# Patient Record
Sex: FEMALE | Race: WHITE | NOT HISPANIC OR LATINO
[De-identification: names, ages, dates, MRNs, and addresses within clinical notes are randomized per-mention and may not be internally consistent; named-entity substitution may affect disease eponyms.]

---

## 2020-03-30 PROBLEM — Z00.00 ENCOUNTER FOR PREVENTIVE HEALTH EXAMINATION: Status: ACTIVE | Noted: 2020-03-30

## 2020-04-14 ENCOUNTER — APPOINTMENT (OUTPATIENT)
Dept: OBGYN | Facility: CLINIC | Age: 30
End: 2020-04-14

## 2020-08-28 ENCOUNTER — APPOINTMENT (OUTPATIENT)
Dept: OBGYN | Facility: CLINIC | Age: 30
End: 2020-08-28

## 2020-09-03 ENCOUNTER — APPOINTMENT (OUTPATIENT)
Dept: OBGYN | Facility: CLINIC | Age: 30
End: 2020-09-03

## 2020-10-20 ENCOUNTER — OUTPATIENT (OUTPATIENT)
Dept: OUTPATIENT SERVICES | Facility: HOSPITAL | Age: 30
LOS: 1 days | Discharge: HOME | End: 2020-10-20

## 2020-10-20 DIAGNOSIS — K02.63 DENTAL CARIES ON SMOOTH SURFACE PENETRATING INTO PULP: ICD-10-CM

## 2020-10-28 ENCOUNTER — OUTPATIENT (OUTPATIENT)
Dept: OUTPATIENT SERVICES | Facility: HOSPITAL | Age: 30
LOS: 1 days | Discharge: HOME | End: 2020-10-28

## 2020-10-28 DIAGNOSIS — K06.1 GINGIVAL ENLARGEMENT: ICD-10-CM

## 2020-11-27 ENCOUNTER — APPOINTMENT (OUTPATIENT)
Dept: OBGYN | Facility: CLINIC | Age: 30
End: 2020-11-27
Payer: MEDICAID

## 2020-11-27 PROCEDURE — 99072 ADDL SUPL MATRL&STAF TM PHE: CPT

## 2020-11-27 PROCEDURE — 99213 OFFICE O/P EST LOW 20 MIN: CPT

## 2020-11-27 PROCEDURE — 81002 URINALYSIS NONAUTO W/O SCOPE: CPT

## 2021-05-22 ENCOUNTER — INPATIENT (INPATIENT)
Facility: HOSPITAL | Age: 31
LOS: 1 days | Discharge: HOME | End: 2021-05-24
Attending: INTERNAL MEDICINE | Admitting: INTERNAL MEDICINE
Payer: MEDICAID

## 2021-05-22 VITALS
HEIGHT: 66 IN | SYSTOLIC BLOOD PRESSURE: 126 MMHG | RESPIRATION RATE: 18 BRPM | DIASTOLIC BLOOD PRESSURE: 76 MMHG | TEMPERATURE: 99 F | WEIGHT: 134.04 LBS | HEART RATE: 116 BPM | OXYGEN SATURATION: 99 %

## 2021-05-22 LAB
ALBUMIN SERPL ELPH-MCNC: 4.3 G/DL — SIGNIFICANT CHANGE UP (ref 3.5–5.2)
ALP SERPL-CCNC: 84 U/L — SIGNIFICANT CHANGE UP (ref 30–115)
ALT FLD-CCNC: 15 U/L — SIGNIFICANT CHANGE UP (ref 0–41)
ANION GAP SERPL CALC-SCNC: 10 MMOL/L — SIGNIFICANT CHANGE UP (ref 7–14)
APPEARANCE UR: CLEAR — SIGNIFICANT CHANGE UP
AST SERPL-CCNC: 17 U/L — SIGNIFICANT CHANGE UP (ref 0–41)
BASOPHILS # BLD AUTO: 0.03 K/UL — SIGNIFICANT CHANGE UP (ref 0–0.2)
BASOPHILS NFR BLD AUTO: 0.4 % — SIGNIFICANT CHANGE UP (ref 0–1)
BILIRUB SERPL-MCNC: 0.6 MG/DL — SIGNIFICANT CHANGE UP (ref 0.2–1.2)
BILIRUB UR-MCNC: NEGATIVE — SIGNIFICANT CHANGE UP
BUN SERPL-MCNC: 17 MG/DL — SIGNIFICANT CHANGE UP (ref 10–20)
CALCIUM SERPL-MCNC: 9.8 MG/DL — SIGNIFICANT CHANGE UP (ref 8.5–10.1)
CHLORIDE SERPL-SCNC: 103 MMOL/L — SIGNIFICANT CHANGE UP (ref 98–110)
CO2 SERPL-SCNC: 25 MMOL/L — SIGNIFICANT CHANGE UP (ref 17–32)
COLOR SPEC: YELLOW — SIGNIFICANT CHANGE UP
CREAT SERPL-MCNC: 0.8 MG/DL — SIGNIFICANT CHANGE UP (ref 0.7–1.5)
DIFF PNL FLD: NEGATIVE — SIGNIFICANT CHANGE UP
EOSINOPHIL # BLD AUTO: 0.06 K/UL — SIGNIFICANT CHANGE UP (ref 0–0.7)
EOSINOPHIL NFR BLD AUTO: 0.7 % — SIGNIFICANT CHANGE UP (ref 0–8)
GLUCOSE SERPL-MCNC: 102 MG/DL — HIGH (ref 70–99)
GLUCOSE UR QL: NEGATIVE MG/DL — SIGNIFICANT CHANGE UP
HCG SERPL-ACNC: <0.6 MIU/ML — SIGNIFICANT CHANGE UP
HCT VFR BLD CALC: 44 % — SIGNIFICANT CHANGE UP (ref 37–47)
HGB BLD-MCNC: 14 G/DL — SIGNIFICANT CHANGE UP (ref 12–16)
IMM GRANULOCYTES NFR BLD AUTO: 0.4 % — HIGH (ref 0.1–0.3)
KETONES UR-MCNC: NEGATIVE — SIGNIFICANT CHANGE UP
LACTATE SERPL-SCNC: 0.8 MMOL/L — SIGNIFICANT CHANGE UP (ref 0.7–2)
LEUKOCYTE ESTERASE UR-ACNC: NEGATIVE — SIGNIFICANT CHANGE UP
LYMPHOCYTES # BLD AUTO: 1 K/UL — LOW (ref 1.2–3.4)
LYMPHOCYTES # BLD AUTO: 12 % — LOW (ref 20.5–51.1)
MCHC RBC-ENTMCNC: 27.3 PG — SIGNIFICANT CHANGE UP (ref 27–31)
MCHC RBC-ENTMCNC: 31.8 G/DL — LOW (ref 32–37)
MCV RBC AUTO: 85.9 FL — SIGNIFICANT CHANGE UP (ref 81–99)
MONOCYTES # BLD AUTO: 0.45 K/UL — SIGNIFICANT CHANGE UP (ref 0.1–0.6)
MONOCYTES NFR BLD AUTO: 5.4 % — SIGNIFICANT CHANGE UP (ref 1.7–9.3)
NEUTROPHILS # BLD AUTO: 6.79 K/UL — HIGH (ref 1.4–6.5)
NEUTROPHILS NFR BLD AUTO: 81.1 % — HIGH (ref 42.2–75.2)
NITRITE UR-MCNC: NEGATIVE — SIGNIFICANT CHANGE UP
NRBC # BLD: 0 /100 WBCS — SIGNIFICANT CHANGE UP (ref 0–0)
PH UR: 6 — SIGNIFICANT CHANGE UP (ref 5–8)
PLATELET # BLD AUTO: 248 K/UL — SIGNIFICANT CHANGE UP (ref 130–400)
POTASSIUM SERPL-MCNC: 4.3 MMOL/L — SIGNIFICANT CHANGE UP (ref 3.5–5)
POTASSIUM SERPL-SCNC: 4.3 MMOL/L — SIGNIFICANT CHANGE UP (ref 3.5–5)
PROT SERPL-MCNC: 7.2 G/DL — SIGNIFICANT CHANGE UP (ref 6–8)
PROT UR-MCNC: NEGATIVE MG/DL — SIGNIFICANT CHANGE UP
RAPID RVP RESULT: SIGNIFICANT CHANGE UP
RBC # BLD: 5.12 M/UL — SIGNIFICANT CHANGE UP (ref 4.2–5.4)
RBC # FLD: 12.4 % — SIGNIFICANT CHANGE UP (ref 11.5–14.5)
SARS-COV-2 RNA SPEC QL NAA+PROBE: SIGNIFICANT CHANGE UP
SODIUM SERPL-SCNC: 138 MMOL/L — SIGNIFICANT CHANGE UP (ref 135–146)
SP GR SPEC: 1.02 — SIGNIFICANT CHANGE UP (ref 1.01–1.03)
UROBILINOGEN FLD QL: 0.2 MG/DL — SIGNIFICANT CHANGE UP (ref 0.2–0.2)
WBC # BLD: 8.36 K/UL — SIGNIFICANT CHANGE UP (ref 4.8–10.8)
WBC # FLD AUTO: 8.36 K/UL — SIGNIFICANT CHANGE UP (ref 4.8–10.8)

## 2021-05-22 PROCEDURE — 73130 X-RAY EXAM OF HAND: CPT | Mod: 26,LT

## 2021-05-22 PROCEDURE — 99223 1ST HOSP IP/OBS HIGH 75: CPT

## 2021-05-22 PROCEDURE — 99285 EMERGENCY DEPT VISIT HI MDM: CPT

## 2021-05-22 RX ORDER — IBUPROFEN 200 MG
400 TABLET ORAL EVERY 6 HOURS
Refills: 0 | Status: COMPLETED | OUTPATIENT
Start: 2021-05-22 | End: 2021-05-23

## 2021-05-22 RX ORDER — AMPICILLIN SODIUM AND SULBACTAM SODIUM 250; 125 MG/ML; MG/ML
3 INJECTION, POWDER, FOR SUSPENSION INTRAMUSCULAR; INTRAVENOUS EVERY 6 HOURS
Refills: 0 | Status: DISCONTINUED | OUTPATIENT
Start: 2021-05-22 | End: 2021-05-24

## 2021-05-22 RX ORDER — ACETAMINOPHEN 500 MG
650 TABLET ORAL EVERY 6 HOURS
Refills: 0 | Status: DISCONTINUED | OUTPATIENT
Start: 2021-05-22 | End: 2021-05-24

## 2021-05-22 RX ORDER — AMPICILLIN SODIUM AND SULBACTAM SODIUM 250; 125 MG/ML; MG/ML
3 INJECTION, POWDER, FOR SUSPENSION INTRAMUSCULAR; INTRAVENOUS ONCE
Refills: 0 | Status: COMPLETED | OUTPATIENT
Start: 2021-05-22 | End: 2021-05-22

## 2021-05-22 RX ORDER — HYDROXYCHLOROQUINE SULFATE 200 MG
1 TABLET ORAL
Qty: 0 | Refills: 0 | DISCHARGE

## 2021-05-22 RX ORDER — HYDROXYCHLOROQUINE SULFATE 200 MG
200 TABLET ORAL
Refills: 0 | Status: DISCONTINUED | OUTPATIENT
Start: 2021-05-22 | End: 2021-05-24

## 2021-05-22 RX ORDER — VANCOMYCIN HCL 1 G
750 VIAL (EA) INTRAVENOUS EVERY 12 HOURS
Refills: 0 | Status: DISCONTINUED | OUTPATIENT
Start: 2021-05-22 | End: 2021-05-22

## 2021-05-22 RX ADMIN — Medication 650 MILLIGRAM(S): at 19:01

## 2021-05-22 RX ADMIN — Medication 650 MILLIGRAM(S): at 18:39

## 2021-05-22 RX ADMIN — AMPICILLIN SODIUM AND SULBACTAM SODIUM 200 GRAM(S): 250; 125 INJECTION, POWDER, FOR SUSPENSION INTRAMUSCULAR; INTRAVENOUS at 15:16

## 2021-05-22 RX ADMIN — Medication 400 MILLIGRAM(S): at 23:14

## 2021-05-22 RX ADMIN — Medication 250 MILLIGRAM(S): at 18:39

## 2021-05-22 RX ADMIN — Medication 400 MILLIGRAM(S): at 21:30

## 2021-05-22 RX ADMIN — Medication 200 MILLIGRAM(S): at 18:39

## 2021-05-22 RX ADMIN — AMPICILLIN SODIUM AND SULBACTAM SODIUM 200 GRAM(S): 250; 125 INJECTION, POWDER, FOR SUSPENSION INTRAMUSCULAR; INTRAVENOUS at 21:30

## 2021-05-22 NOTE — CONSULT NOTE ADULT - ASSESSMENT
Patient is a 31y old  Female with PMH of Lupus, now presents to ER for evaluation of Left hand swelling and pain, had dog bite 2 days ago. She was got bit by her Husky while giving her medications. Since then pt noticed progressive left hand edema and erythema. Pt reports oozing from the wound this morning that prompted visit to ER.  She has no fever at home. On admission, she has no fever but tachycardic. The XRay of left hand shows no evidence of acute fracture or dislocation. She has started on Unasyn, Vancomycin and the ID consult requested to assist with further evaluation and antibiotic management.    # Left UE cellulitis- due to Dog bite, Dog is fully vaccinated  # Lupus    Would recommend:    1. Follow up Blood cultures  2. Keep Left UE elevated  3. Pain management as needed  4. Continue Unasyn and Discontinue Vancomycin  5. Local wound care    d/w Patient     will follow the patient with you and make further recommendation based on the clinical course and Lab results  Thank you for the opportunity to participate in Ms. KENNY's care    Attending Attestation:    Spent more than 65 minutes on total encounter, more than 50 % of the visit was spent counseling and/or coordinating care by the Attending physician.

## 2021-05-22 NOTE — ED PROVIDER NOTE - OBJECTIVE STATEMENT
32 y/o female with hx of SLE on 32 y/o female with hx of SLE presents with swelling to left wrist and hand worsening since last night. patient s/p dog bite last night provoked by giving dog medications. patient utd with vacinations. patient denies any fevers. +extending redness to forearm. no axillary tenderness. +oozing from wounds. decreased rom of digits due to swelling. c/o throbbing pain

## 2021-05-22 NOTE — CONSULT NOTE ADULT - CONSULT REASON
Hand Cellulitis
Hide Include Location In Plan Question?: No
Include Location In Plan?: Yes
Detail Level: Zone
Left hand cellulitis

## 2021-05-22 NOTE — ED PROVIDER NOTE - ATTENDING CONTRIBUTION TO CARE
30 yo F h/o Lupus presents with dog bite to left hand sustained 2 days ago by her own dog while giving him meds.  Pt UTD with her Tetanus.  This am noticed oozing from wound. Has swelling and difficulty moving the fingers.  On exam pt in NAD AAO x 3, + swelling to left hand and wrist with erythema and warmth extending to mid forearm, + linear scabbing to left dorsal hand with oozing, + abrasion to palm as well, painful movement of fingers, + distal pulses,

## 2021-05-22 NOTE — ED PROVIDER NOTE - CLINICAL SUMMARY MEDICAL DECISION MAKING FREE TEXT BOX
Pt with dog bite with cellulitis.  no FB or fx on x ray.  Pt with h/o Lupus will require admission for IV abx.

## 2021-05-22 NOTE — CHART NOTE - NSCHARTNOTEFT_GEN_A_CORE
Left hand soaked in warm water and dorsal/palmar scabs unroofed: able to exprewss approx 3-4 cc pus from palmar scab at base of 4/5th digits.   Dr. Fall aware; cont soaks, iv abx. Will re-eval in AM

## 2021-05-22 NOTE — H&P ADULT - ASSESSMENT
31 year old female with PMH of lupus presents to ED c/o swelling to left wrist s/p dog bite 2 days ago. Pt repots being utd     #Cellulitis of left hand likely 2/2 dog bite  - XR left hand demonstrated no evidence of acute fracture or dislocation or radiopaque foreign body. Hands soft tissue swelling noted.  - Pt was administered Unasyn 3g IV x1. Will continue IV Unasyn and add Vancomycin   - blood cx/wound cx  - ID consult  - hand surgery  - AM labs  - supportive care    # Tachycardia  - likely reactive  - will monitor HR    # Lupus  - continue Plaquenil    DVT ppx; encourage ambulation     31 year old female with PMH of lupus presents to ED c/o swelling to left wrist s/p dog bite 2 days ago. Pt repots being utd     #Cellulitis of left hand likely 2/2 dog bite  - XR left hand demonstrated no evidence of acute fracture or dislocation or radiopaque foreign body. Hands soft tissue swelling noted.  - Pt was administered Unasyn 3g IV x1. Will continue IV Unasyn and add Vancomycin   - blood cx/wound cx  - ID consult  - hand surgery (discussed case with dr. lackey who will evaluate the patient shortly)  - AM labs  - supportive care    # Tachycardia  - likely reactive  - will monitor HR    # Lupus  - continue Plaquenil    DVT ppx; encourage ambulation

## 2021-05-22 NOTE — CONSULT NOTE ADULT - SUBJECTIVE AND OBJECTIVE BOX
31 year old female with PMH of lupus presents to ED c/o dog bite 2 days ago. Pt reports she was got bit by her Husky while giving her medications. Since then pt noticed progressive left hand edema and erythema. Pt reports oozing from the wound this morning that prompted visit to ED. Pt also admits to difficulty bending her fingers secondary to edema. She denies fever, chills, SOB, CP, N/V/D/C, hand tremors. Pt states this is the 2nd time being bit by her dog. First time she was bit in 2017 when dog woken up from sleep and got scared. Pt was treated with abx, no hospitalization was required at that time.   In the ED, at afebrile, tachycardic to 116. CBC/CMP wnl, no leukocytosis. XR left hand demonstrated no evidence of acute fracture or dislocation. No evidence of radiopaque foreign body. Hands soft tissue swelling noted. Pt was administered Unasyn 3g IV x1.  Hand surgery was consulted for hand cellulitis.    PAST MEDICAL & SURGICAL HISTORY:  Lupus          MEDICATIONS  (STANDING):  ampicillin/sulbactam  IVPB 3 Gram(s) IV Intermittent every 6 hours  hydroxychloroquine 200 milliGRAM(s) Oral two times a day  vancomycin  IVPB 750 milliGRAM(s) IV Intermittent every 12 hours      Allergies: Bactrim (Unknown)    SOCIAL HISTORY:  Tobacco use: denies  Alcohol use: reports occasional use  Drug use: denies       FAMILY HISTORY:  Pt reports no significant PFHx        I&O's Summary      Vital Signs Last 24 Hrs  T(C): 36.7 (22 May 2021 17:45), Max: 37 (22 May 2021 14:07)  T(F): 98 (22 May 2021 17:45), Max: 98.6 (22 May 2021 14:07)  HR: 85 (22 May 2021 17:45) (85 - 116)  BP: 106/57 (22 May 2021 17:45) (106/57 - 126/76)  RR: 18 (22 May 2021 17:45) (18 - 18)  SpO2: 100% (22 May 2021 17:45) (99% - 100%)    Physical Exam:  General:  WD, WN, conversant in NAD, resting comfortably.  HEENT:  NC/AT, EOMI  Pulmonary:  CTA B/L, Normal resp effort.  Cardiovascular:  S1 & S2, NSR, no murmurs, rubs or gallops.  Abdominal: soft, ND/NT  Extremities: + left hand erythema and edema; erythema extending to mid forearm; three about 1cm abrasions (1 dorsal and 2 palmar surface), no drainage noted one dog bite on the forearm.  2 on the dorsal aspect of the hand, and 2 on the angel aspect.  Neurovascular intact.  No sign of compartment syndrome  full ROM of the 2nd, 3th, and 4th finger with pain.  Neuro:  Awake, alert & oriented x 3          LABS:                        14.0   8.36  )-----------( 248      ( 22 May 2021 15:28 )             44.0     05-22    138  |  103  |  17  ----------------------------<  102<H>  4.3   |  25  |  0.8    Ca    9.8      22 May 2021 15:28    TPro  7.2  /  Alb  4.3  /  TBili  0.6  /  DBili  x   /  AST  17  /  ALT  15  /  AlkPhos  84  05-22      LIVER FUNCTIONS - ( 22 May 2021 15:28 )  Alb: 4.3 g/dL / Pro: 7.2 g/dL / ALK PHOS: 84 U/L / ALT: 15 U/L / AST: 17 U/L / GGT: x             Cultures: pending      RADIOLOGY & ADDITIONAL STUDIES:   Xray Hand 3 Views, Left (05.22.21 @ 14:50)   Findings:  No evidence of acute fracture or dislocation.  No evidence of radiopaque foreign body.  Hands soft tissue swelling noted.    Impression:  No evidence of acute osseous abnormality.  No evidence of radiopaque foreign body.  Hands soft tissue swelling.        EDGAR PINEDA MD; Attending Radiologist  This document has been electronically signed. May 22 2021  4:21PM

## 2021-05-22 NOTE — ED ADULT NURSE NOTE - NSIMPLEMENTINTERV_GEN_ALL_ED
Implemented All Universal Safety Interventions:  [place] to call system. Call bell, personal items and telephone within reach. Instruct patient to call for assistance. Room bathroom lighting operational. Non-slip footwear when patient is off stretcher. Physically safe environment: no spills, clutter or unnecessary equipment. Stretcher in lowest position, wheels locked, appropriate side rails in place.

## 2021-05-22 NOTE — ED PROVIDER NOTE - NS ED ROS FT
Review of Systems    Constitutional: (-) fever/ chills   Eyes (-) visual changes  ENT: (-) epistaxis (-) sore throat (-) ear pain  Cardiovascular: (-) chest pain, (-) syncope (-) palpitations  Respiratory: (-) cough, (-) shortness of breath  Gastrointestinal: (-) vomiting, (-) diarrhea (-) abdominal pain  neck: (-) neck pain or stiffness  Musculoskeletal:  (+)decreased rom of digits   Integumentary: (+)swelling , erythema left hand   Neurological: (-) headache, (-) altered mental status

## 2021-05-22 NOTE — CONSULT NOTE ADULT - SUBJECTIVE AND OBJECTIVE BOX
Patient is a 31y old  Female who presents with a chief complaint of           REVIEW OF SYSTEMS: Total of twelve systems have been reviewed with patient and found to be negative unless mentioned in HPI        PAST MEDICAL & SURGICAL HISTORY:  Lupus        SOCIAL HISTORY  Alcohol: Does not drink  Tobacco: Does not smoke  Illicit substance use: None      FAMILY HISTORY: Non contributory to the present illness        ALLERGIES: Bactrim (Unknown)        Vital Signs Last 24 Hrs  T(C): 36.7 (22 May 2021 17:45), Max: 37 (22 May 2021 14:07)  T(F): 98 (22 May 2021 17:45), Max: 98.6 (22 May 2021 14:07)  HR: 85 (22 May 2021 17:45) (85 - 116)  BP: 106/57 (22 May 2021 17:45) (106/57 - 126/76)  BP(mean): --  RR: 18 (22 May 2021 17:45) (18 - 18)  SpO2: 100% (22 May 2021 17:45) (99% - 100%)        PHYSICAL EXAM:  GENERAL: Not in distress   CHEST/LUNG:  Aire ntry bilaterally  HEART: s1 and s2 present  ABDOMEN:  Nontender and  Nondistended  EXTREMITIES: No pedal  edema  CNS: Awake and Alert      LABS:                        14.0   8.36  )-----------( 248      ( 22 May 2021 15:28 )             44.0     05-22    138  |  103  |  17  ----------------------------<  102<H>  4.3   |  25  |  0.8    Ca    9.8      22 May 2021 15:28    TPro  7.2  /  Alb  4.3  /  TBili  0.6  /  DBili  x   /  AST  17  /  ALT  15  /  AlkPhos  84  05-22          MEDICATIONS  (STANDING):  ampicillin/sulbactam  IVPB 3 Gram(s) IV Intermittent every 6 hours  hydroxychloroquine 200 milliGRAM(s) Oral two times a day  vancomycin  IVPB 750 milliGRAM(s) IV Intermittent every 12 hours    MEDICATIONS  (PRN):  acetaminophen   Tablet .. 650 milliGRAM(s) Oral every 6 hours PRN Mild Pain (1 - 3)        RADIOLOGY & ADDITIONAL TESTS:      r< from: Xray Hand 3 Views, Left (05.22.21 @ 14:50) >  Impression:  No evidence of acute osseous abnormality.  No evidence of radiopaque foreign body.  Hands soft tissue swelling.    < end of copied text >        Respiratory Viral Panel with COVID-19 by KRISTAN (05.22.21 @ 14:48)   Rapid RVP Result: NotDete   SARS-CoV-2: NotDetec:       Patient is a 31y old  Female with PMH of Lupus, now presents to ER for evaluation of Left hand swelling and pain, had dog bite 2 days ago. She was got bit by her Husky while giving her medications. Since then pt noticed progressive left hand edema and erythema. Pt reports oozing from the wound this morning that prompted visit to ER.  She has no fever at home. On admission, she has no fever but tachycardic. The XRay of left hand shows no evidence of acute fracture or dislocation. She has started on Unasyn, Vancomycin and the ID consult requested to assist with further evaluation and antibiotic management.      REVIEW OF SYSTEMS: Total of twelve systems have been reviewed with patient and found to be negative unless mentioned in HPI      PAST MEDICAL & SURGICAL HISTORY:  Lupus      SOCIAL HISTORY  Alcohol: Does not drink  Tobacco: Does not smoke  Illicit substance use: None      FAMILY HISTORY: Non contributory to the present illness      ALLERGIES: Bactrim (Unknown)      Vital Signs Last 24 Hrs  T(C): 36.7 (22 May 2021 17:45), Max: 37 (22 May 2021 14:07)  T(F): 98 (22 May 2021 17:45), Max: 98.6 (22 May 2021 14:07)  HR: 85 (22 May 2021 17:45) (85 - 116)  BP: 106/57 (22 May 2021 17:45) (106/57 - 126/76)  BP(mean): --  RR: 18 (22 May 2021 17:45) (18 - 18)  SpO2: 100% (22 May 2021 17:45) (99% - 100%)        PHYSICAL EXAM:  GENERAL: Not in distress   CHEST/LUNG: Not using accessory muscles  HEART: s1 and s2 present  ABDOMEN:  Nontender and  Nondistended  EXTREMITIES: Left UE swollen, painful with a small wound on hand  CNS: Awake and Alert      LABS:                        14.0   8.36  )-----------( 248      ( 22 May 2021 15:28 )             44.0     05-22    138  |  103  |  17  ----------------------------<  102<H>  4.3   |  25  |  0.8    Ca    9.8      22 May 2021 15:28    TPro  7.2  /  Alb  4.3  /  TBili  0.6  /  DBili  x   /  AST  17  /  ALT  15  /  AlkPhos  84  05-22          MEDICATIONS  (STANDING):  ampicillin/sulbactam  IVPB 3 Gram(s) IV Intermittent every 6 hours  hydroxychloroquine 200 milliGRAM(s) Oral two times a day  vancomycin  IVPB 750 milliGRAM(s) IV Intermittent every 12 hours    MEDICATIONS  (PRN):  acetaminophen   Tablet .. 650 milliGRAM(s) Oral every 6 hours PRN Mild Pain (1 - 3)        RADIOLOGY & ADDITIONAL TESTS:      r< from: Xray Hand 3 Views, Left (05.22.21 @ 14:50) >  Impression:  No evidence of acute osseous abnormality.  No evidence of radiopaque foreign body.  Hands soft tissue swelling.        MICROBIOLOGY DATA:    Respiratory Viral Panel with COVID-19 by KRISTAN (05.22.21 @ 14:48)   Rapid RVP Result: Laurenterachel   SARS-CoV-2: NotDetec:

## 2021-05-22 NOTE — ED ADULT TRIAGE NOTE - CHIEF COMPLAINT QUOTE
Patient complaining of swelling and redness to left hand after being bitten by her dog. Patient was giving dog its medicine when the incident occured

## 2021-05-22 NOTE — ED PROVIDER NOTE - PHYSICAL EXAMINATION
Vital Signs: I have reviewed the initial vital signs.  Constitutional: well-nourished, no acute distress, normocephalic  Eyes: PERRLA, EOMI, clear conjunctiva  ENT: MMM,   Cardiovascular: regular rate, regular rhythm, no murmur appreciated  Respiratory: unlabored respiratory effort, clear to auscultation bilaterally  Gastrointestinal: soft, non-tender, non-distended  abdomen,  Musculoskeletal: left hand- +grossly swollen extending to digits, no lymphangitis, no bony tenderness or crepitation  Integumentary: left hand +erythema , warmpth, extending to mid forearm, +abrasion with oozing serous scant drainge dorsal and palmar surface  Neurologic: awake, alert, cranial nerves II-XII grossly intact, extremities’ motor and sensory functions grossly intact, no focal deficits, GCS 15  heme: no adenopathy

## 2021-05-22 NOTE — H&P ADULT - NSHPPHYSICALEXAM_GEN_ALL_CORE
VITALS:   T(C): 37 (05-22-21 @ 14:07), Max: 37 (05-22-21 @ 14:07)  HR: 116 (05-22-21 @ 14:07) (116 - 116)  BP: 126/76 (05-22-21 @ 14:07) (126/76 - 126/76)  RR: 18 (05-22-21 @ 14:07) (18 - 18)  SpO2: 99% (05-22-21 @ 14:07) (99% - 99%)    GENERAL: NAD, sitting in bed comfortably. Mother at bedside  HEAD:  Atraumatic, Normocephalic  EYES: EOMI, PERRLA  ENT: Moist mucous membranes  NECK: Supple  CHEST/LUNG: CTA bilaterally, no wheezing or rubs. Unlabored respirations  HEART: Regular rate and rhythm, no murmurs, rubs, or gallops  ABDOMEN: Bowel sounds present; Soft, Nontender, Nondistended  EXTREMITIES:  + left hand edema extending to digits, no bony tenderness or crepitation noted  NERVOUS SYSTEM:  Alert & Oriented X3, speech clear  SKIN: + left hand erythema and edema; erythema extending to mid forearm; three about 1cm abrasions (1 dorsal and 2 palmar surface), no drainage noted VITALS:   T(C): 37 (05-22-21 @ 14:07), Max: 37 (05-22-21 @ 14:07)  HR: 116 (05-22-21 @ 14:07) (116 - 116)  BP: 126/76 (05-22-21 @ 14:07) (126/76 - 126/76)  RR: 18 (05-22-21 @ 14:07) (18 - 18)  SpO2: 99% (05-22-21 @ 14:07) (99% - 99%)    GENERAL: NAD, sitting in bed comfortably. Mother at bedside  HEAD:  Atraumatic, Normocephalic  EYES: EOMI, PERRLA  ENT: Moist mucous membranes  NECK: Supple  CHEST/LUNG: CTA bilaterally, no wheezing or rubs. Unlabored respirations  HEART: Regular rate and rhythm, no murmurs, rubs, or gallops  ABDOMEN: Bowel sounds present; Soft, Nontender, Nondistended  EXTREMITIES:  + left hand edema extending to digits, no bony tenderness or crepitation noted  NERVOUS SYSTEM:  Alert & Oriented X3, speech clear  SKIN: + left hand erythema and edema; erythema extending to mid forearm; three about 1cm abrasions (1 dorsal and 2 palmar surface), no drainage noted one dog bite on the forearm.  2 on the dorsal aspect of the hand, and 2 on the angel aspect.  neurovascular intact.  no sign of compartment syndrome  full ROM of the 2nd, 3th, and 4th finger with pain.

## 2021-05-22 NOTE — H&P ADULT - HISTORY OF PRESENT ILLNESS
31 year old female with PMH of lupus presents to ED c/o dog bite 2 days ago. Pt reports she was got bit by her Husky while giving her medications. Since then pt noticed progressive left hand edema and erythema. Pt reports oozing from the wound this morning that prompted visit to ED. Pt also admits to difficulty bending her fingers secondary to edema. She denies fever, chills, SOB, CP, N/V/D/C, hand tremors. Pt states this is the 2nd time being bit by her dog. First time she was bit in 2017 when dog woken up from sleep and got scared. Pt was treated with abx, no hospitalization was required at that time.   In the ED, at afebrile, tachycardic to 116. CBC/CMP wnl, no leukocytosis. XR left hand demonstrated no evidence of acute fracture or dislocation.  No evidence of radiopaque foreign body. Hands soft tissue swelling noted.  Pt was administered Unasyn 3g IV x1.

## 2021-05-22 NOTE — ED PROVIDER NOTE - CARE PLAN
Principal Discharge DX:	Dog bite of hand  Secondary Diagnosis:	Cellulitis of hand, left  Secondary Diagnosis:	Infected dog bite

## 2021-05-22 NOTE — H&P ADULT - NSHPLABSRESULTS_GEN_ALL_CORE
14.0   8.36  )-----------( 248      ( 22 May 2021 15:28 )             44.0       05-22    138  |  103  |  17  ----------------------------<  102<H>  4.3   |  25  |  0.8    Ca    9.8      22 May 2021 15:28    TPro  7.2  /  Alb  4.3  /  TBili  0.6  /  DBili  x   /  AST  17  /  ALT  15  /  AlkPhos  84  05-22        Lactate Trend  05-22 @ 15:28 Lactate:0.8         RADIOLOGY:  Xray Hand 3 Views, Left (05.22.21 @ 14:50)     Findings:  No evidence of acute fracture or dislocation.  No evidence of radiopaque foreign body.  Hands soft tissue swelling noted.    Impression:  No evidence of acute osseous abnormality.  No evidence of radiopaque foreign body.  Hands soft tissue swelling.      EDGAR PINEDA MD; Attending Radiologist  This document has been electronically signed. May 22 2021  4:21PM

## 2021-05-22 NOTE — CONSULT NOTE ADULT - ASSESSMENT
31 year old female with PMH of lupus presents with left hand cellulitis s/p dog bite 2 days ago.    Plan:  - continue IV abx  - f/u would and blood cx  - ID consult  - supportive care  - will follow      Will d/w Dr. Fall 31 year old female with PMH of lupus presents with left hand cellulitis s/p dog bite 2 days ago.    Plan:  - continue IV abx  - f/u would and blood cx  - ID consult  - supportive care  - will follow      Will d/w Dr. Fall    ADDENDUM: case D/W :   - no acute surgical intervention  - recommend warm water/betadine soaks x 15 minutes, TID   - will unroof all wounds to allow for drainage   - hand elevation  -will make NPO p MN & re-eval in AM   - continue IV abx  - will follow

## 2021-05-23 LAB
ANION GAP SERPL CALC-SCNC: 9 MMOL/L — SIGNIFICANT CHANGE UP (ref 7–14)
BUN SERPL-MCNC: 13 MG/DL — SIGNIFICANT CHANGE UP (ref 10–20)
CALCIUM SERPL-MCNC: 9 MG/DL — SIGNIFICANT CHANGE UP (ref 8.5–10.1)
CHLORIDE SERPL-SCNC: 107 MMOL/L — SIGNIFICANT CHANGE UP (ref 98–110)
CO2 SERPL-SCNC: 24 MMOL/L — SIGNIFICANT CHANGE UP (ref 17–32)
COVID-19 SPIKE DOMAIN AB INTERP: NEGATIVE — SIGNIFICANT CHANGE UP
COVID-19 SPIKE DOMAIN ANTIBODY RESULT: 0.4 U/ML — SIGNIFICANT CHANGE UP
CREAT SERPL-MCNC: 0.7 MG/DL — SIGNIFICANT CHANGE UP (ref 0.7–1.5)
GLUCOSE SERPL-MCNC: 101 MG/DL — HIGH (ref 70–99)
HCT VFR BLD CALC: 43.2 % — SIGNIFICANT CHANGE UP (ref 37–47)
HGB BLD-MCNC: 13.6 G/DL — SIGNIFICANT CHANGE UP (ref 12–16)
MCHC RBC-ENTMCNC: 27.2 PG — SIGNIFICANT CHANGE UP (ref 27–31)
MCHC RBC-ENTMCNC: 31.5 G/DL — LOW (ref 32–37)
MCV RBC AUTO: 86.4 FL — SIGNIFICANT CHANGE UP (ref 81–99)
NRBC # BLD: 0 /100 WBCS — SIGNIFICANT CHANGE UP (ref 0–0)
PLATELET # BLD AUTO: 233 K/UL — SIGNIFICANT CHANGE UP (ref 130–400)
POTASSIUM SERPL-MCNC: 4.3 MMOL/L — SIGNIFICANT CHANGE UP (ref 3.5–5)
POTASSIUM SERPL-SCNC: 4.3 MMOL/L — SIGNIFICANT CHANGE UP (ref 3.5–5)
RBC # BLD: 5 M/UL — SIGNIFICANT CHANGE UP (ref 4.2–5.4)
RBC # FLD: 12.3 % — SIGNIFICANT CHANGE UP (ref 11.5–14.5)
SARS-COV-2 IGG+IGM SERPL QL IA: 0.4 U/ML — SIGNIFICANT CHANGE UP
SARS-COV-2 IGG+IGM SERPL QL IA: NEGATIVE — SIGNIFICANT CHANGE UP
SODIUM SERPL-SCNC: 140 MMOL/L — SIGNIFICANT CHANGE UP (ref 135–146)
WBC # BLD: 6.88 K/UL — SIGNIFICANT CHANGE UP (ref 4.8–10.8)
WBC # FLD AUTO: 6.88 K/UL — SIGNIFICANT CHANGE UP (ref 4.8–10.8)

## 2021-05-23 PROCEDURE — 99233 SBSQ HOSP IP/OBS HIGH 50: CPT

## 2021-05-23 RX ADMIN — AMPICILLIN SODIUM AND SULBACTAM SODIUM 200 GRAM(S): 250; 125 INJECTION, POWDER, FOR SUSPENSION INTRAMUSCULAR; INTRAVENOUS at 21:07

## 2021-05-23 RX ADMIN — Medication 400 MILLIGRAM(S): at 14:06

## 2021-05-23 RX ADMIN — Medication 400 MILLIGRAM(S): at 15:16

## 2021-05-23 RX ADMIN — Medication 200 MILLIGRAM(S): at 17:08

## 2021-05-23 RX ADMIN — Medication 200 MILLIGRAM(S): at 06:51

## 2021-05-23 RX ADMIN — AMPICILLIN SODIUM AND SULBACTAM SODIUM 200 GRAM(S): 250; 125 INJECTION, POWDER, FOR SUSPENSION INTRAMUSCULAR; INTRAVENOUS at 09:08

## 2021-05-23 RX ADMIN — AMPICILLIN SODIUM AND SULBACTAM SODIUM 200 GRAM(S): 250; 125 INJECTION, POWDER, FOR SUSPENSION INTRAMUSCULAR; INTRAVENOUS at 15:08

## 2021-05-23 RX ADMIN — AMPICILLIN SODIUM AND SULBACTAM SODIUM 200 GRAM(S): 250; 125 INJECTION, POWDER, FOR SUSPENSION INTRAMUSCULAR; INTRAVENOUS at 03:00

## 2021-05-23 NOTE — PROGRESS NOTE ADULT - ASSESSMENT
31 year old female with PMH of lupus presents with left hand cellulitis s/p dog bite 3 days ago.    Plan:   - no acute surgical intervention  - continue  warm water/betadine soaks x 15 minutes, TID   - hand elevation   - per ID: unasyn only   - above D/W Dr. Fall

## 2021-05-23 NOTE — PROGRESS NOTE ADULT - SUBJECTIVE AND OBJECTIVE BOX
CC.  Left hand infection s/p dog bite  HPI.  Patient reports left hand swelling, and erythema are improving.  afebrile  able to moves fingers more .  denies any numbness or weakness  offers no other complaints              Constitutional: No fever, fatigue or weight loss.  Skin: No rash.  Eyes: No recent vision problems or eye pain.  ENT: No congestion, ear pain, or sore throat.  Endocrine: No thyroid problems.  Cardiovascular: No chest pain or palpation.  Respiratory: No cough, shortness of breath, congestion, or wheezing.  Gastrointestinal: No abdominal pain, nausea, vomiting, or diarrhea.  Genitourinary: No dysuria.  Musculoskeletal: No joint swelling.  Neurologic: No headache.      Vital Signs Last 24 Hrs  T(C): 36.3 (21 @ 04:45), Max: 37 (21 @ 14:07)  T(F): 97.4 (21 @ 04:45), Max: 98.6 (21 @ 14:07)  HR: 64 (21 @ 04:45) (64 - 116)  BP: 100/55 (21 @ 04:45) (100/55 - 126/76)  BP(mean): --  RR: 18 (21 @ 04:45) (18 - 18)  SpO2: 100% (21 @ 17:45) (99% - 100%)        PHYSICAL EXAM-  GENERAL: NAD,    HEAD:  Atraumatic, Normocephalic  EYES: EOMI, PERRLA, conjunctiva and sclera clear  NECK: Supple, No JVD, Normal thyroid  NERVOUS SYSTEM:  Alert & Oriented X3, Moving all extremities  CHEST/LUNG: Clear to percussion bilaterally; No rales, rhonchi, wheezing, or rubs  HEART: Regular rate and rhythm; No murmurs, rubs, or gallops  ABDOMEN: Soft, Nontender, Nondistended; Bowel sounds present  EXTREMITIES:  left hand exam shows decrease swelling, and erythema.  min discharge from the wound.  less tender.  Full ROM of all fingers.  neurovascular intact.  no sign of compartment syndrome  SKIN: No rashes or lesions                                  13.6   6.88  )-----------( 233      ( 23 May 2021 07:02 )             43.2         140  |  107  |  13  ----------------------------<  101<H>  4.3   |  24  |  0.7    Ca    9.0      23 May 2021 07:02    TPro  7.2  /  Alb  4.3  /  TBili  0.6  /  DBili  x   /  AST  17  /  ALT  15  /  AlkPhos  84  05-22          Urinalysis Basic - ( 22 May 2021 20:43 )    Color: Yellow / Appearance: Clear / S.020 / pH: x  Gluc: x / Ketone: Negative  / Bili: Negative / Urobili: 0.2 mg/dL   Blood: x / Protein: Negative mg/dL / Nitrite: Negative   Leuk Esterase: Negative / RBC: x / WBC x   Sq Epi: x / Non Sq Epi: x / Bacteria: x              MEDICATIONS  (STANDING):  ampicillin/sulbactam  IVPB 3 Gram(s) IV Intermittent every 6 hours  hydroxychloroquine 200 milliGRAM(s) Oral two times a day    MEDICATIONS  (PRN):  acetaminophen   Tablet .. 650 milliGRAM(s) Oral every 6 hours PRN Mild Pain (1 - 3)  ibuprofen  Tablet. 400 milliGRAM(s) Oral every 6 hours PRN Mild Pain (1 - 3)    Imaging Personally Reviewed:     [x ] YES  [ ] NO    Consultant(s) Notes Reviewed:  [x ] YES  [ ] NO    Care Discussed with Consultants/Other Providers [x ] YES  [ ] No medical contraindication for discharge

## 2021-05-23 NOTE — PROGRESS NOTE ADULT - ASSESSMENT
31 year old female with PMH of lupus presents to ED c/o swelling to left wrist s/p dog bite 2 days ago. Pt repots being utd     #Cellulitis of left hand likely 2/2 dog bite  - XR left hand demonstrated no evidence of acute fracture or dislocation or radiopaque foreign body. Hands soft tissue swelling noted.  - Continue with current antibiotic pending blood cx/wound cx  - ID and  hand surgery to follow up       # Tachycardia  - likely reactive  -resolved    # Lupus  - continue Plaquenil    DVT ppx; encourage ambulation    #Progress Note Handoff  Pending (specify):  still acute   Family discussion:  plan of care was discussed with patient  in details.  all questions were answered.  seems to understand, and in agreement  Disposition:  unknown

## 2021-05-23 NOTE — PROGRESS NOTE ADULT - SUBJECTIVE AND OBJECTIVE BOX
Patient is seen and examined at the bed side, is afebrile.        REVIEW OF SYSTEMS: All other review systems are negative      ALLERGIES: Bactrim (Unknown)      Vital Signs Last 24 Hrs  T(C): 36.3 (23 May 2021 13:54), Max: 36.8 (22 May 2021 21:25)  T(F): 97.3 (23 May 2021 13:54), Max: 98.2 (22 May 2021 21:25)  HR: 75 (23 May 2021 13:54) (64 - 75)  BP: 101/63 (23 May 2021 13:54) (100/55 - 115/76)  BP(mean): --  RR: 18 (23 May 2021 04:45) (18 - 18)  SpO2: --      PHYSICAL EXAM:  GENERAL: Not in distress   CHEST/LUNG: Not using accessory muscles  HEART: s1 and s2 present  ABDOMEN:  Nontender and  Nondistended  EXTREMITIES: Left UE swollen, painful with a small wound on hand  CNS: Awake and Alert      LABS:                        13.6   6.88  )-----------( 233      ( 23 May 2021 07:02 )             43.2                           14.0   8.36  )-----------( 248      ( 22 May 2021 15:28 )             44.0       05-23    140  |  107  |  13  ----------------------------<  101<H>  4.3   |  24  |  0.7    Ca    9.0      23 May 2021 07:02    TPro  7.2  /  Alb  4.3  /  TBili  0.6  /  DBili  x   /  AST  17  /  ALT  15  /  AlkPhos  84  05-22 05-22    138  |  103  |  17  ----------------------------<  102<H>  4.3   |  25  |  0.8    Ca    9.8      22 May 2021 15:28    TPro  7.2  /  Alb  4.3  /  TBili  0.6  /  DBili  x   /  AST  17  /  ALT  15  /  AlkPhos  84  05-22          MEDICATIONS  (STANDING):    ampicillin/sulbactam  IVPB 3 Gram(s) IV Intermittent every 6 hours  hydroxychloroquine 200 milliGRAM(s) Oral two times a day          RADIOLOGY & ADDITIONAL TESTS:      r< from: Xray Hand 3 Views, Left (05.22.21 @ 14:50) >  Impression:  No evidence of acute osseous abnormality.  No evidence of radiopaque foreign body.  Hands soft tissue swelling.        MICROBIOLOGY DATA:    ic  Respiratory Viral Panel with COVID-19 by KRISTAN (05.22.21 @ 14:48)   Rapid RVP Result: NotDete   SARS-CoV-2: NotDetec:         Patient is seen and examined at the bed side, is afebrile. She is feeling better, the swelling and pain of Left hand has improved significantly. The Blood and wound culture has no growth to date.      REVIEW OF SYSTEMS: All other review systems are negative      ALLERGIES: Bactrim (Unknown)      Vital Signs Last 24 Hrs  T(C): 36.3 (23 May 2021 13:54), Max: 36.8 (22 May 2021 21:25)  T(F): 97.3 (23 May 2021 13:54), Max: 98.2 (22 May 2021 21:25)  HR: 75 (23 May 2021 13:54) (64 - 75)  BP: 101/63 (23 May 2021 13:54) (100/55 - 115/76)  BP(mean): --  RR: 18 (23 May 2021 04:45) (18 - 18)  SpO2: --      PHYSICAL EXAM:  GENERAL: Not in distress   CHEST/LUNG: Not using accessory muscles  HEART: s1 and s2 present  ABDOMEN:  Nontender and  Nondistended  EXTREMITIES: Left UE swelling and pain improved significantly   CNS: Awake and Alert      LABS:                        13.6   6.88  )-----------( 233      ( 23 May 2021 07:02 )             43.2                           14.0   8.36  )-----------( 248      ( 22 May 2021 15:28 )             44.0       05-23    140  |  107  |  13  ----------------------------<  101<H>  4.3   |  24  |  0.7    Ca    9.0      23 May 2021 07:02    TPro  7.2  /  Alb  4.3  /  TBili  0.6  /  DBili  x   /  AST  17  /  ALT  15  /  AlkPhos  84  05-22    05-22    138  |  103  |  17  ----------------------------<  102<H>  4.3   |  25  |  0.8    Ca    9.8      22 May 2021 15:28    TPro  7.2  /  Alb  4.3  /  TBili  0.6  /  DBili  x   /  AST  17  /  ALT  15  /  AlkPhos  84  05-22          MEDICATIONS  (STANDING):    ampicillin/sulbactam  IVPB 3 Gram(s) IV Intermittent every 6 hours  hydroxychloroquine 200 milliGRAM(s) Oral two times a day          RADIOLOGY & ADDITIONAL TESTS:      r< from: Xray Hand 3 Views, Left (05.22.21 @ 14:50) >  Impression:  No evidence of acute osseous abnormality.  No evidence of radiopaque foreign body.  Hands soft tissue swelling.        MICROBIOLOGY DATA:    Culture - Blood (05.22.21 @ 17:49)   Specimen Source: .Blood Blood   Culture Results: No growth to date.     Culture - Other (05.22.21 @ 17:48)   Specimen Source: .Other left hand wound culture   Culture Results: No growth     Respiratory Viral Panel with COVID-19 by KRISTAN (05.22.21 @ 14:48)   Rapid RVP Result: Laurenterachel   SARS-CoV-2: NotDetec:

## 2021-05-23 NOTE — PROGRESS NOTE ADULT - SUBJECTIVE AND OBJECTIVE BOX
S; left hand swelling and pain improved. Afebrile  O; ICU Vital Signs Last 24 Hrs  T(C): 36.3 (23 May 2021 04:45), Max: 37 (22 May 2021 14:07)  T(F): 97.4 (23 May 2021 04:45), Max: 98.6 (22 May 2021 14:07)  HR: 64 (23 May 2021 04:45) (64 - 116)  BP: 100/55 (23 May 2021 04:45) (100/55 - 126/76)  RR: 18 (23 May 2021 04:45) (18 - 18)  SpO2: 100% (22 May 2021 17:45) (99% - 100%)    MEDICATIONS  (STANDING):  ampicillin/sulbactam  IVPB 3 Gram(s) IV Intermittent every 6 hours  hydroxychloroquine 200 milliGRAM(s) Oral two times a day       S; left hand swelling and pain improved. Afebrile  O; ICU Vital Signs Last 24 Hrs  T(C): 36.3 (23 May 2021 04:45), Max: 37 (22 May 2021 14:07)  T(F): 97.4 (23 May 2021 04:45), Max: 98.6 (22 May 2021 14:07)  HR: 64 (23 May 2021 04:45) (64 - 116)  BP: 100/55 (23 May 2021 04:45) (100/55 - 126/76)  RR: 18 (23 May 2021 04:45) (18 - 18)  SpO2: 100% (22 May 2021 17:45) (99% - 100%)    MEDICATIONS  (STANDING):  ampicillin/sulbactam  IVPB 3 Gram(s) IV Intermittent every 6 hours  hydroxychloroquine 200 milliGRAM(s) Oral two times a day      EXAM:  Ext: left hand with improved swelling/erythema/tenderness; puncture sites (dorsal x1; palmar x 2) without drainage/purulence noted    Labs:  CAPILLARY BLOOD GLUCOSE                              13.6   6.88  )-----------( 233      ( 23 May 2021 07:02 )             43.2       Auto Immature Granulocyte %: 0.4 % (21 @ 15:28)  Auto Neutrophil %: 81.1 % (21 @ 15:28)        140  |  107  |  13  ----------------------------<  101<H>  4.3   |  24  |  0.7      Calcium, Total Serum: 9.0 mg/dL (21 @ 07:02)      LFTs:             7.2  | 0.6  | 17       ------------------[84      ( 22 May 2021 15:28 )  4.3  | x    | 15          Lipase:x      Amylase:x         Lactate, Blood: 0.8 mmol/L (21 @ 15:28)      Coags:        Urinalysis Basic - ( 22 May 2021 20:43 )    Color: Yellow / Appearance: Clear / S.020 / pH: x  Gluc: x / Ketone: Negative  / Bili: Negative / Urobili: 0.2 mg/dL   Blood: x / Protein: Negative mg/dL / Nitrite: Negative   Leuk Esterase: Negative / RBC: x / WBC x   Sq Epi: x / Non Sq Epi: x / Bacteria: x

## 2021-05-24 ENCOUNTER — TRANSCRIPTION ENCOUNTER (OUTPATIENT)
Age: 31
End: 2021-05-24

## 2021-05-24 VITALS
HEART RATE: 68 BPM | RESPIRATION RATE: 19 BRPM | SYSTOLIC BLOOD PRESSURE: 97 MMHG | DIASTOLIC BLOOD PRESSURE: 55 MMHG | TEMPERATURE: 97 F

## 2021-05-24 LAB
CULTURE RESULTS: SIGNIFICANT CHANGE UP
SPECIMEN SOURCE: SIGNIFICANT CHANGE UP

## 2021-05-24 PROCEDURE — 99239 HOSP IP/OBS DSCHRG MGMT >30: CPT

## 2021-05-24 RX ORDER — ACETAMINOPHEN 500 MG
2 TABLET ORAL
Qty: 0 | Refills: 0 | DISCHARGE
Start: 2021-05-24

## 2021-05-24 RX ADMIN — AMPICILLIN SODIUM AND SULBACTAM SODIUM 200 GRAM(S): 250; 125 INJECTION, POWDER, FOR SUSPENSION INTRAMUSCULAR; INTRAVENOUS at 10:01

## 2021-05-24 RX ADMIN — Medication 200 MILLIGRAM(S): at 05:43

## 2021-05-24 RX ADMIN — Medication 400 MILLIGRAM(S): at 05:43

## 2021-05-24 RX ADMIN — AMPICILLIN SODIUM AND SULBACTAM SODIUM 200 GRAM(S): 250; 125 INJECTION, POWDER, FOR SUSPENSION INTRAMUSCULAR; INTRAVENOUS at 03:47

## 2021-05-24 NOTE — DISCHARGE NOTE PROVIDER - CARE PROVIDER_API CALL
Rajat Fall  PLASTIC SURGERY  [address] [place] [address]  Phone: [phone]  Fax: [phone]  Established Patient  Follow Up Time: 1 week

## 2021-05-24 NOTE — PROGRESS NOTE ADULT - SUBJECTIVE AND OBJECTIVE BOX
PARTHA KENNY  31y, Female  Allergy: Bactrim (Unknown)      LOS  2d    CHIEF COMPLAINT: Dog bite left hand (24 May 2021 07:19)      INTERVAL EVENTS/HPI  - No acute events overnight  - T(F): , Max: 97.3 (21 @ 13:54)  - Denies any worsening symptoms  - Tolerating medication  - WBC Count: 6.88 (21 @ 07:02)  WBC Count: 8.36 (21 @ 15:28)     - Creatinine, Serum: 0.7 (21 @ 07:02)  Creatinine, Serum: 0.8 (21 @ 15:28)       ROS  General: Denies rigors, nightsweats  HEENT: Denies headache, rhinorrhea, sore throat, eye pain  CV: Denies CP, palpitations  PULM: Denies wheezing, hemoptysis  GI: Denies hematemesis, hematochezia, melena  : Denies discharge, hematuria  MSK: Denies arthralgias, myalgias  SKIN: Denies rash, lesions  NEURO: Denies paresthesias, weakness  PSYCH: Denies depression, anxiety    VITALS:  T(F): 97, Max: 97.3 (21 @ 13:54)  HR: 68  BP: 97/55  RR: 19Vital Signs Last 24 Hrs  T(C): 36.1 (24 May 2021 05:10), Max: 36.3 (23 May 2021 13:54)  T(F): 97 (24 May 2021 05:10), Max: 97.3 (23 May 2021 13:54)  HR: 68 (24 May 2021 05:10) (68 - 75)  BP: 97/55 (24 May 2021 05:10) (97/55 - 101/63)  BP(mean): --  RR: 19 (24 May 2021 05:10) (19 - 19)  SpO2: --    PHYSICAL EXAM:  Gen: NAD, resting in bed  HEENT: Normocephalic, atraumatic  Neck: supple, no lymphadenopathy  CV: Regular rate & regular rhythm  Lungs: decreased BS at bases, no fremitus  Abdomen: Soft, BS present  Ext: Warm, well perfused  Neuro: non focal, awake  Skin: no rash, no erythema; Left hand swelling improving - less erythema   Lines: no phlebitis    FH: Non-contributory  Social Hx: Non-contributory    TESTS & MEASUREMENTS:                        13.6   6.88  )-----------( 233      ( 23 May 2021 07:02 )             43.2         140  |  107  |  13  ----------------------------<  101<H>  4.3   |  24  |  0.7    Ca    9.0      23 May 2021 07:02    TPro  7.2  /  Alb  4.3  /  TBili  0.6  /  DBili  x   /  AST  17  /  ALT  15  /  AlkPhos  84        LIVER FUNCTIONS - ( 22 May 2021 15:28 )  Alb: 4.3 g/dL / Pro: 7.2 g/dL / ALK PHOS: 84 U/L / ALT: 15 U/L / AST: 17 U/L / GGT: x           Urinalysis Basic - ( 22 May 2021 20:43 )    Color: Yellow / Appearance: Clear / S.020 / pH: x  Gluc: x / Ketone: Negative  / Bili: Negative / Urobili: 0.2 mg/dL   Blood: x / Protein: Negative mg/dL / Nitrite: Negative   Leuk Esterase: Negative / RBC: x / WBC x   Sq Epi: x / Non Sq Epi: x / Bacteria: x        Culture - Blood (collected 21 @ 17:49)  Source: .Blood Blood  Preliminary Report (21 @ 02:01):    No growth to date.    Culture - Other (collected 21 @ 17:48)  Source: .Other left hand wound culture  Preliminary Report (21 @ 20:14):    No growth        Lactate, Blood: 0.8 mmol/L (21 @ 15:28)      INFECTIOUS DISEASES TESTING  Rapid RVP Result: NotDetec (21 @ 14:48)      INFLAMMATORY MARKERS      RADIOLOGY & ADDITIONAL TESTS:  I have personally reviewed the last available Chest xray  CXR      CT      CARDIOLOGY TESTING      MEDICATIONS  ampicillin/sulbactam  IVPB 3 IV Intermittent every 6 hours  hydroxychloroquine 200 Oral two times a day      WEIGHT  Weight (kg): 60.8 (21 @ 14:07)      ANTIBIOTICS:  ampicillin/sulbactam  IVPB 3 Gram(s) IV Intermittent every 6 hours  hydroxychloroquine 200 milliGRAM(s) Oral two times a day      All available historical records have been reviewed

## 2021-05-24 NOTE — DISCHARGE NOTE PROVIDER - NSDCACTIVITY_GEN_ALL_CORE
Showering allowed/Walking - Indoors allowed/Walking - Outdoors allowed Do not drive or operate machinery/Showering allowed/Do not make important decisions/Walking - Indoors allowed/No heavy lifting/straining/Walking - Outdoors allowed

## 2021-05-24 NOTE — PROGRESS NOTE ADULT - ASSESSMENT
Patient is a 31y old  Female with PMH of Lupus, now presents to ER for evaluation of Left hand swelling and pain, had dog bite 2 days ago. She was got bit by her Husky while giving her medications. Since then pt noticed progressive left hand edema and erythema. Pt reports oozing from the wound this morning that prompted visit to ER.  She has no fever at home. On admission, she has no fever but tachycardic. The XRay of left hand shows no evidence of acute fracture or dislocation. She has started on Unasyn, Vancomycin and the ID consult requested to assist with further evaluation and antibiotic management.    # Left UE cellulitis- due to Dog bite, Dog is fully vaccinated  - wound Cx NG  # Lupus    Recommendations  - change to oral Augmentin 875 mg q 12hours on discharge (end date 5/31 to complete 10 days)  - Keep Left UE elevated    Please call or message on [employer] Teams if with any questions.  Spectra [phone]

## 2021-05-24 NOTE — DISCHARGE NOTE PROVIDER - NSDCMRMEDTOKEN_GEN_ALL_CORE_FT
acetaminophen 325 mg oral tablet: 2 tab(s) orally every 6 hours, As needed, Mild Pain (1 - 3)  Augmentin 875 mg-125 mg oral tablet: 1 tab(s) orally 2 times a day   Plaquenil 200 mg oral tablet: 1 tab(s) orally 2 times a day

## 2021-05-24 NOTE — PROGRESS NOTE ADULT - ASSESSMENT
31 year old female with PMH of lupus presents with left hand cellulitis s/p dog bite 3 days ago.    Plan:   - no acute surgical intervention  - continue  warm water/betadine soaks x 15 minutes, TID  - hand elevation   - per ID: unasyn only, can change to PO Augmentin on discharge  - outpatient FU with Dr. Fall on discharge.     - reconsult PRN  - will d/w attending 31 year old female with PMH of lupus presents with left hand cellulitis s/p dog bite 3 days ago.    Plan:  - no acute surgical intervention  - continue  warm water/betadine soaks x 15 minutes, TID  - hand elevation  - ABX per ID  - ok to d/c from Hand Sx standpoint  - outpatient FU with Dr. Fall on discharge.  1 week.  Call office to schedule  - reconsult PRN  - d/w attending

## 2021-05-24 NOTE — DISCHARGE NOTE PROVIDER - HOSPITAL COURSE
31 year old female with PMH of lupus presents to ED c/o dog bite 2 days ago. Pt reports she was got bit by her Husky while giving her medications. Since then pt noticed progressive left hand edema and erythema. Pt reports oozing from the wound this morning that prompted visit to ED. Pt also admits to difficulty bending her fingers secondary to edema. She denies fever, chills, SOB, CP, N/V/D/C, hand tremors. Pt states this is the 2nd time being bit by her dog. First time she was bit in 2017 when dog woken up from sleep and got scared. Pt was treated with abx, no hospitalization was required at that time.     Cellulitis of left hand likely 2/2 dog bite  - XR left hand demonstrated no evidence of acute fracture or dislocation or radiopaque foreign body. Hands soft tissue swelling noted.    started on IV abx   hand consult :  recc:   Left hand soaked in warm water and dorsal/palmar scabs unroofed: able to express approx 3-4 cc pus from palmar scab at base of 4/5th digits.   Dr. Fall aware; cont soaks, iv abx. Will re-eval in AM.    afeb / wbc trending down, responding to IVBx    ID: recc aug 875mg bid x 10 days    f/u with hand surgeon 31 year old female with PMH of lupus presents to ED c/o dog bite 2 days ago. Pt reports she was got bit by her Husky while giving her medications. Since then pt noticed progressive left hand edema and erythema. Pt reports oozing from the wound this morning that prompted visit to ED. Pt also admits to difficulty bending her fingers secondary to edema. She denies fever, chills, SOB, CP, N/V/D/C, hand tremors. Pt states this is the 2nd time being bit by her dog. First time she was bit in 2017 when dog woken up from sleep and got scared. Pt was treated with abx, no hospitalization was required at that time.     Cellulitis of left hand likely 2/2 dog bite  - XR left hand demonstrated no evidence of acute fracture or dislocation or radiopaque foreign body. Hands soft tissue swelling noted.    started on IV abx   hand consult :  recc:   Left hand soaked in warm water and dorsal/palmar scabs unroofed: able to express approx 3-4 cc pus from palmar scab at base of 4/5th digits.   Dr. Fall aware; cont soaks, iv abx.     afeb / wbc trending down, responding to IVBx    ID: recc aug 875mg bid x 10 days    f/u with hand surgeon    Patient was seen and examined today  feels better.  Offers no other complaints  hand swelling resolving.    denies any numbness or weakness  Constitutional: No fever, fatigue or weight loss.  Skin: No rash.  Eyes: No recent vision problems or eye pain.  ENT: No congestion, ear pain, or sore throat.  Endocrine: No thyroid problems.  Cardiovascular: No chest pain or palpation.  Respiratory: No cough, shortness of breath, congestion, or wheezing.  Gastrointestinal: No abdominal pain, nausea, vomiting, or diarrhea.  Genitourinary: No dysuria.  Musculoskeletal: No joint swelling.  Neurologic: No headache.   Vital Signs Last 24 Hrs  T(C): 36.1 (05-24-21 @ 05:10), Max: 36.3 (05-23-21 @ 13:54)  T(F): 97 (05-24-21 @ 05:10), Max: 97.3 (05-23-21 @ 13:54)  HR: 68 (05-24-21 @ 05:10) (68 - 75)  BP: 97/55 (05-24-21 @ 05:10) (97/55 - 101/63)  BP(mean): --  RR: 19 (05-24-21 @ 05:10) (19 - 19)  SpO2: --  PHYSICAL EXAM-  GENERAL: NAD,    HEAD:  Atraumatic, Normocephalic  EYES: EOMI, PERRLA, conjunctiva and sclera clear  NECK: Supple, No JVD, Normal thyroid  NERVOUS SYSTEM:  Alert & Oriented X3, Motor Strength 5/5 B/L upper and lower extremities; DTRs 2+ intact and symmetric  CHEST/LUNG: Clear to percussion bilaterally; No rales, rhonchi, wheezing, or rubs  HEART: Regular rate and rhythm; No murmurs, rubs, or gallops  ABDOMEN: Soft, Nontender, Nondistended; Bowel sounds present  EXTREMITIES:  left hand swelling are resolving.  full ROM of all fingers.  Motor, sensory are intacted.  no discharge  SKIN: No rashes or lesions  cleared for discharged as per ID, and surgery  Hospital course, and discharge planning were discussed with patient, and niece in details.  all questions were answered.  seems to understand, and in agreement.  time 70 min

## 2021-05-24 NOTE — DISCHARGE NOTE NURSING/CASE MANAGEMENT/SOCIAL WORK - PATIENT PORTAL LINK FT
You can access the FollowMyHealth Patient Portal offered by [facility] by registering at the following website: [email or web address]. By joining [employer]’s FollowMyHealth portal, you will also be able to view your health information using other applications (apps) compatible with our system.

## 2021-05-24 NOTE — DISCHARGE NOTE PROVIDER - NSDCFUADDINST_GEN_ALL_CORE_FT
if any fever over 102 , call office or report to nearest ER    may return to work on MAY 31st 2021  keep wound covered with gauze

## 2021-05-24 NOTE — PROGRESS NOTE ADULT - SUBJECTIVE AND OBJECTIVE BOX
Patient seen and examined at bedside.  Resting comfortably.  Afebrile.  No complaints of pain.  Edema improving.  Patient requesting discharge home.      Vital Signs Last 24 Hrs  T(C): 36.1 (24 May 2021 05:10), Max: 36.3 (23 May 2021 13:54)  T(F): 97 (24 May 2021 05:10), Max: 97.3 (23 May 2021 13:54)  HR: 68 (24 May 2021 05:10) (68 - 75)  BP: 97/55 (24 May 2021 05:10) (97/55 - 101/63)  BP(mean): --  RR: 19 (24 May 2021 05:10) (19 - 19)  SpO2: --    General Appearance: NAD  MS: Left hand w/edema.  No erythema.  + ecchymosis noted; puncture sites (dorsal x1; palmar x 2) without drainage/purulence noted  CNS: A/O x 3    MEDICATIONS  (STANDING):  ampicillin/sulbactam  IVPB 3 Gram(s) IV Intermittent every 6 hours  hydroxychloroquine 200 milliGRAM(s) Oral two times a day    MEDICATIONS  (PRN):  acetaminophen   Tablet .. 650 milliGRAM(s) Oral every 6 hours PRN Mild Pain (1 - 3)      LABS:                        13.6   6.88  )-----------( 233      ( 23 May 2021 07:02 )             43.2     -    140  |  107  |  13  ----------------------------<  101<H>  4.3   |  24  |  0.7    Ca    9.0      23 May 2021 07:02    TPro  7.2  /  Alb  4.3  /  TBili  0.6  /  DBili  x   /  AST  17  /  ALT  15  /  AlkPhos  84  -      Urinalysis Basic - ( 22 May 2021 20:43 )    Color: Yellow / Appearance: Clear / S.020 / pH: x  Gluc: x / Ketone: Negative  / Bili: Negative / Urobili: 0.2 mg/dL   Blood: x / Protein: Negative mg/dL / Nitrite: Negative   Leuk Esterase: Negative / RBC: x / WBC x   Sq Epi: x / Non Sq Epi: x / Bacteria: x        RADIOLOGY & ADDITIONAL STUDIES: Reviewed

## 2021-05-24 NOTE — DISCHARGE NOTE PROVIDER - NSDCCPCAREPLAN_GEN_ALL_CORE_FT
PRINCIPAL DISCHARGE DIAGNOSIS  Diagnosis: Dog bite of hand  Assessment and Plan of Treatment: antibiotics till 5/31 - augmentin  follow up with hand surgeon dr. lackey      SECONDARY DISCHARGE DIAGNOSES  Diagnosis: Systemic lupus  Assessment and Plan of Treatment: continue with medications     PRINCIPAL DISCHARGE DIAGNOSIS  Diagnosis: Dog bite of hand  Assessment and Plan of Treatment: antibiotics till 5/31 - augmentin  follow up with hand surgeon dr. lackey in one week      SECONDARY DISCHARGE DIAGNOSES  Diagnosis: Systemic lupus  Assessment and Plan of Treatment: continue with medications

## 2021-05-24 NOTE — PROGRESS NOTE ADULT - NSICDXPILOT_GEN_ALL_CORE
Pt has appt scheduled on 10/12 for cold like symptoms. pt states she does not have fever, she does have cough, sore throat, congestion, headache, and some night sweats. Pt is requesting something be called in before her appt. Pt has tried verena seltzer plus and thera flu. Please advise.     Pt had TB quantiferon gold test ordered on 5/1/17 which was not completed.   
[place]

## 2021-05-28 LAB
CULTURE RESULTS: SIGNIFICANT CHANGE UP
SPECIMEN SOURCE: SIGNIFICANT CHANGE UP
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
## 2021-06-01 DIAGNOSIS — M32.9 SYSTEMIC LUPUS ERYTHEMATOSUS, UNSPECIFIED: ICD-10-CM

## 2021-06-01 DIAGNOSIS — S61.452A OPEN BITE OF LEFT HAND, INITIAL ENCOUNTER: ICD-10-CM
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
## 2021-06-01 DIAGNOSIS — L03.114 CELLULITIS OF LEFT UPPER LIMB: ICD-10-CM

## 2021-06-01 DIAGNOSIS — R00.0 TACHYCARDIA, UNSPECIFIED: ICD-10-CM
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
## 2021-06-01 DIAGNOSIS — Z88.1 ALLERGY STATUS TO OTHER ANTIBIOTIC AGENTS STATUS: ICD-10-CM

## 2021-11-04 PROBLEM — M32.9 SYSTEMIC LUPUS ERYTHEMATOSUS, UNSPECIFIED: Chronic | Status: ACTIVE | Noted: 2021-05-22

## 2021-12-12 ENCOUNTER — LABORATORY RESULT (OUTPATIENT)
Age: 31
End: 2021-12-12

## 2021-12-13 ENCOUNTER — APPOINTMENT (OUTPATIENT)
Dept: OBGYN | Facility: CLINIC | Age: 31
End: 2021-12-13
Payer: MEDICAID

## 2021-12-13 VITALS
TEMPERATURE: 98.7 F | HEART RATE: 100 BPM | HEIGHT: 62 IN | DIASTOLIC BLOOD PRESSURE: 62 MMHG | WEIGHT: 152 LBS | OXYGEN SATURATION: 96 % | SYSTOLIC BLOOD PRESSURE: 100 MMHG | BODY MASS INDEX: 27.97 KG/M2

## 2021-12-13 DIAGNOSIS — Z01.419 ENCOUNTER FOR GYNECOLOGICAL EXAMINATION (GENERAL) (ROUTINE) W/OUT ABNORMAL FINDINGS: ICD-10-CM

## 2021-12-13 DIAGNOSIS — M32.9 SYSTEMIC LUPUS ERYTHEMATOSUS, UNSPECIFIED: ICD-10-CM

## 2021-12-13 LAB
BILIRUB UR QL STRIP: NORMAL
CLARITY UR: CLEAR
COLLECTION METHOD: NORMAL
GLUCOSE UR-MCNC: NORMAL
HCG UR QL: 0.2 EU/DL
HGB UR QL STRIP.AUTO: NORMAL
KETONES UR-MCNC: NORMAL
LEUKOCYTE ESTERASE UR QL STRIP: NORMAL
NITRITE UR QL STRIP: NORMAL
PH UR STRIP: 7.5
PROT UR STRIP-MCNC: NORMAL
SP GR UR STRIP: 1.02

## 2021-12-13 PROCEDURE — 81003 URINALYSIS AUTO W/O SCOPE: CPT | Mod: QW

## 2021-12-13 PROCEDURE — 99395 PREV VISIT EST AGE 18-39: CPT

## 2021-12-13 NOTE — HISTORY OF PRESENT ILLNESS
[Gonorrhea test offered] : Gonorrhea test offered [Chlamydia test offered] : Chlamydia test offered [Trichomonas test offered] : Trichomonas test offered [HPV test offered] : HPV test offered [N] : Patient does not use contraception [Y] : Patient is sexually active [TextBox_4] : Pt present for annual gyn exam and is requesting STD blood work [Mammogramdate] : -0- [BreastSonogramDate] : -0- [PapSmeardate] : 03/20 [BoneDensityDate] : n/a [ColonoscopyDate] : -0- [GonorrheaDate] : 03/20 [ChlamydiaDate] : 03/20 [TrichomonasDate] : 03/20 [HPVDate] : 03/20 [LMPDate] : 11/16/21 [MensesFreq] : 28 [MensesLength] : 5 [PGxTotal] : 1 [PGHxFullTerm] : 0 [PGHxPremature] : 0 [PGHxAbortions] : 0 [[facility] Centeriving] : 1 [PGHxABInduced] : 0 [PGHxABSpont] : 0 [PGHxEctopic] : 0 [PGHxMultBirths] : 0 [FreeTextEntry1] :  x 1

## 2021-12-21 ENCOUNTER — LABORATORY RESULT (OUTPATIENT)
Age: 31
End: 2021-12-21

## 2022-10-12 ENCOUNTER — OUTPATIENT (OUTPATIENT)
Dept: OUTPATIENT SERVICES | Facility: HOSPITAL | Age: 32
LOS: 1 days | Discharge: HOME | End: 2022-10-12

## 2024-04-13 NOTE — PROGRESS NOTE ADULT - ASSESSMENT
Patient is a 31y old  Female with PMH of Lupus, now presents to ER for evaluation of Left hand swelling and pain, had dog bite 2 days ago. She was got bit by her Husky while giving her medications. Since then pt noticed progressive left hand edema and erythema. Pt reports oozing from the wound this morning that prompted visit to ER.  She has no fever at home. On admission, she has no fever but tachycardic. The XRay of left hand shows no evidence of acute fracture or dislocation. She has started on Unasyn, Vancomycin and the ID consult requested to assist with further evaluation and antibiotic management.    # Left UE cellulitis- due to Dog bite, Dog is fully vaccinated  # Lupus    Would recommend:    1. Follow up Blood cultures  2. Keep Left UE elevated  3. Pain management as needed  4. Continue Unasyn and Discontinue Vancomycin  5. Local wound care    d/w Patient     Attending Attestation:    Spent more than 45 minutes on total encounter, more than 50 % of the visit was spent counseling and/or coordinating care by the Attending physician. Patient is a 31y old  Female with PMH of Lupus, now presents to ER for evaluation of Left hand swelling and pain, had dog bite 2 days ago. She was got bit by her Husky while giving her medications. Since then pt noticed progressive left hand edema and erythema. Pt reports oozing from the wound this morning that prompted visit to ER.  She has no fever at home. On admission, she has no fever but tachycardic. The XRay of left hand shows no evidence of acute fracture or dislocation. She has started on Unasyn, Vancomycin and the ID consult requested to assist with further evaluation and antibiotic management.    # Left UE cellulitis- due to Dog bite, Dog is fully vaccinated  # Lupus    Would recommend:    1.  Continue Unasyn and may change to oral Augmentin 875 mg q 12hours on discharge if cultures remains negative  2. Keep Left UE elevated  3. Pain management as needed  4. Local wound care  5. Need at least 10 dyas of Abx    d/w Patient     Attending Attestation:    Spent more than 45 minutes on total encounter, more than 50 % of the visit was spent counseling and/or coordinating care by the Attending physician.   0

## 2025-07-22 ENCOUNTER — NON-APPOINTMENT (OUTPATIENT)
Age: 35
End: 2025-07-22